# Patient Record
Sex: MALE | Race: WHITE
[De-identification: names, ages, dates, MRNs, and addresses within clinical notes are randomized per-mention and may not be internally consistent; named-entity substitution may affect disease eponyms.]

---

## 2017-01-20 ENCOUNTER — TRANSCRIPTION ENCOUNTER (OUTPATIENT)
Age: 27
End: 2017-01-20

## 2017-12-07 ENCOUNTER — TRANSCRIPTION ENCOUNTER (OUTPATIENT)
Age: 27
End: 2017-12-07

## 2022-02-26 PROBLEM — Z00.00 ENCOUNTER FOR PREVENTIVE HEALTH EXAMINATION: Status: ACTIVE | Noted: 2022-02-26

## 2022-09-26 ENCOUNTER — APPOINTMENT (EMERGENCY)
Dept: RADIOLOGY | Facility: HOSPITAL | Age: 32
End: 2022-09-26
Payer: COMMERCIAL

## 2022-09-26 ENCOUNTER — HOSPITAL ENCOUNTER (EMERGENCY)
Facility: HOSPITAL | Age: 32
Discharge: HOME | End: 2022-09-26
Attending: EMERGENCY MEDICINE | Admitting: EMERGENCY MEDICINE
Payer: COMMERCIAL

## 2022-09-26 VITALS
BODY MASS INDEX: 25.47 KG/M2 | HEIGHT: 72 IN | TEMPERATURE: 98.1 F | DIASTOLIC BLOOD PRESSURE: 70 MMHG | RESPIRATION RATE: 18 BRPM | WEIGHT: 188 LBS | OXYGEN SATURATION: 96 % | SYSTOLIC BLOOD PRESSURE: 126 MMHG | HEART RATE: 63 BPM

## 2022-09-26 DIAGNOSIS — M79.605 LEFT LEG PAIN: Primary | ICD-10-CM

## 2022-09-26 PROCEDURE — 99281 EMR DPT VST MAYX REQ PHY/QHP: CPT

## 2022-09-26 PROCEDURE — 93971 EXTREMITY STUDY: CPT | Mod: LT

## 2022-09-26 RX ORDER — VILAZODONE HYDROCHLORIDE 10 MG/1
20 TABLET ORAL
COMMUNITY
Start: 2022-07-23

## 2022-09-26 RX ORDER — DESVENLAFAXINE SUCCINATE 25 MG/1
25 TABLET, EXTENDED RELEASE ORAL EVERY MORNING
COMMUNITY
Start: 2022-09-19

## 2022-09-26 ASSESSMENT — ENCOUNTER SYMPTOMS
SHORTNESS OF BREATH: 0
MYALGIAS: 1
FEVER: 0
LIGHT-HEADEDNESS: 0
COUGH: 0

## 2022-09-26 NOTE — DISCHARGE INSTRUCTIONS
You were sent here for ultrasound imaging of your left leg.  This was negative for acute blood clot in the deep vein of your leg.  We suggest you rest and not run until your pain improves.  Take ibuprofen (ie: Advil or Motrin) as per package instructions for pain  control. You can take 400mg to 600mg every 4 to 6 hours. Do not take more than 2400mg ibuprofren in 24 hours.  Always take ibuprofen with something in your stomach as it can irritate your stomach lining. Please be sure to drink plenty of fluids.     Return with any significant swelling, worsening pain or any new or concerning symptoms.

## 2022-09-26 NOTE — ED PROVIDER NOTES
Emergency Medicine Note  HPI   HISTORY OF PRESENT ILLNESS     HPI 32-year-old male with past medical history of anxiety presents for evaluation of left lower extremity pain.  Patient states he noticed pain that is intermittent to left anterior thigh.  He went to urgent care prior to arrival who sent him here for ultrasound imaging to rule out DVT.  He notes he has been running frequently this week otherwise denies injury or trauma.  He denies pain currently.  No pain to posterior leg, no associated swelling, erythema, chest pain, shortness of breath, lightheadedness or dizziness, syncope or cough.  He did have a long car ride last weekend, 9 hours.  Otherwise no recent hospitalizations or surgeries.  No history of VTE.      Patient History   PAST HISTORY     Reviewed from Nursing Triage:         Past Medical History:   Diagnosis Date    Anxiety        Past Surgical History:   Procedure Laterality Date    SINUS SURGERY         History reviewed. No pertinent family history.    Social History     Tobacco Use    Smoking status: Never Smoker    Smokeless tobacco: Never Used   Substance Use Topics    Alcohol use: Never    Drug use: Yes         Review of Systems   REVIEW OF SYSTEMS     Review of Systems   Constitutional: Negative for fever.   Respiratory: Negative for cough and shortness of breath.    Cardiovascular: Negative for chest pain and leg swelling.   Musculoskeletal: Positive for myalgias.   Neurological: Negative for syncope and light-headedness.         VITALS     ED Vitals    Date/Time Temp Pulse Resp BP SpO2 [facility]   09/26/22 1641 36.7 °C (98.1 °F) 63 18 126/70 96 % PB                       Physical Exam   PHYSICAL EXAM     Physical Exam  Vitals and nursing note reviewed.   Constitutional:       General: He is not in acute distress.     Appearance: He is well-developed.   HENT:      Head: Atraumatic.   Eyes:      Conjunctiva/sclera: Conjunctivae normal.   Pulmonary:      Effort: Pulmonary effort is normal.    Musculoskeletal:         General: No deformity.      Comments:  No calf or thigh swelling, erythema, tenderness, palpable cords bilaterally. Left leg without reproducible tenderness to anterior thigh. No overlying skin changes. The leg compartments are soft. nv intact distally.    Skin:     General: Skin is warm and dry.   Neurological:      General: No focal deficit present.      Mental Status: He is alert.   Psychiatric:         Behavior: Behavior normal.           PROCEDURES     Procedures     DATA     Results     None          Imaging Results          US venous leg, LL extremity (In process)                 No orders to display       Scoring tools                                  ED Course & MDM   MDM / ED COURSE / CLINICAL IMPRESSION / DISPO     MDM    ED Course as of 09/26/22 1801   Mon Sep 26, 2022   1800 Impression/Plan:    Inmt left leg pain. Unremarkable exam. Us neg for dvt. Stable for dc home. Recc'd rest from running, nsaids. Return precautions discussed.  [JV]      ED Course User Index  [JV] Kristine Magallanes PA C     Clinical Impression      None     Disposition           Kristine Magallanes PA C  09/26/22 1801

## 2022-09-27 NOTE — ED ATTESTATION NOTE
The patient was evaluated and managed by the physician assistant / nurse practitioner.     Kristi Wilkerson MD  09/26/22 2023

## 2022-12-03 ENCOUNTER — HOSPITAL ENCOUNTER (EMERGENCY)
Facility: HOSPITAL | Age: 32
Discharge: HOME | End: 2022-12-03
Attending: EMERGENCY MEDICINE
Payer: COMMERCIAL

## 2022-12-03 ENCOUNTER — APPOINTMENT (EMERGENCY)
Dept: RADIOLOGY | Facility: HOSPITAL | Age: 32
End: 2022-12-03
Payer: COMMERCIAL

## 2022-12-03 VITALS
HEART RATE: 80 BPM | DIASTOLIC BLOOD PRESSURE: 63 MMHG | RESPIRATION RATE: 19 BRPM | BODY MASS INDEX: 25.33 KG/M2 | WEIGHT: 187 LBS | TEMPERATURE: 96.9 F | OXYGEN SATURATION: 96 % | HEIGHT: 72 IN | SYSTOLIC BLOOD PRESSURE: 116 MMHG

## 2022-12-03 DIAGNOSIS — R07.9 NONSPECIFIC CHEST PAIN: Primary | ICD-10-CM

## 2022-12-03 DIAGNOSIS — E83.42 HYPOMAGNESEMIA: ICD-10-CM

## 2022-12-03 LAB
ALBUMIN SERPL-MCNC: 4.2 G/DL (ref 3.4–5)
ALP SERPL-CCNC: 43 IU/L (ref 35–126)
ALT SERPL-CCNC: 26 IU/L (ref 16–63)
ANION GAP SERPL CALC-SCNC: 1 MEQ/L (ref 3–15)
AST SERPL-CCNC: 16 IU/L (ref 15–41)
BASOPHILS # BLD: 0.01 K/UL (ref 0.01–0.1)
BASOPHILS NFR BLD: 0.1 %
BILIRUB SERPL-MCNC: 1.2 MG/DL (ref 0.3–1.2)
BUN SERPL-MCNC: 19 MG/DL (ref 8–20)
CALCIUM SERPL-MCNC: 9 MG/DL (ref 8.9–10.3)
CHLORIDE SERPL-SCNC: 105 MEQ/L (ref 98–109)
CO2 SERPL-SCNC: 31 MEQ/L (ref 22–32)
CREAT SERPL-MCNC: 1.1 MG/DL (ref 0.8–1.3)
DIFFERENTIAL METHOD BLD: ABNORMAL
EOSINOPHIL # BLD: 0.08 K/UL (ref 0.04–0.54)
EOSINOPHIL NFR BLD: 1.1 %
ERYTHROCYTE [DISTWIDTH] IN BLOOD BY AUTOMATED COUNT: 11.8 % (ref 11.6–14.4)
GFR SERPL CREATININE-BSD FRML MDRD: >60 ML/MIN/1.73M*2
GLUCOSE SERPL-MCNC: 124 MG/DL (ref 70–99)
HCT VFR BLDCO AUTO: 40.4 % (ref 40.1–51)
HGB BLD-MCNC: 13.1 G/DL (ref 13.7–17.5)
IMM GRANULOCYTES # BLD AUTO: 0.02 K/UL (ref 0–0.08)
IMM GRANULOCYTES NFR BLD AUTO: 0.3 %
LYMPHOCYTES # BLD: 0.56 K/UL (ref 1.2–3.5)
LYMPHOCYTES NFR BLD: 7.7 %
MAGNESIUM SERPL-MCNC: 1.5 MG/DL (ref 1.8–2.5)
MCH RBC QN AUTO: 27.5 PG (ref 28–33.2)
MCHC RBC AUTO-ENTMCNC: 32.4 G/DL (ref 32.2–36.5)
MCV RBC AUTO: 84.7 FL (ref 83–98)
MONOCYTES # BLD: 0.36 K/UL (ref 0.3–1)
MONOCYTES NFR BLD: 4.9 %
NEUTROPHILS # BLD: 6.25 K/UL (ref 1.7–7)
NEUTS SEG NFR BLD: 85.9 %
NRBC BLD-RTO: 0 %
PDW BLD AUTO: 8.9 FL (ref 9.4–12.4)
PLATELET # BLD AUTO: 178 K/UL (ref 150–350)
POTASSIUM SERPL-SCNC: 3.8 MEQ/L (ref 3.6–5.1)
PROT SERPL-MCNC: 6.8 G/DL (ref 6–8.2)
RBC # BLD AUTO: 4.77 M/UL (ref 4.5–5.8)
SODIUM SERPL-SCNC: 137 MEQ/L (ref 136–144)
TROPONIN I SERPL HS-MCNC: 5.9 PG/ML
TROPONIN I SERPL HS-MCNC: 6 PG/ML
TSH SERPL DL<=0.05 MIU/L-ACNC: 0.94 MIU/L (ref 0.34–5.6)
WBC # BLD AUTO: 7.28 K/UL (ref 3.8–10.5)

## 2022-12-03 PROCEDURE — 63700000 HC SELF-ADMINISTRABLE DRUG: Performed by: PHYSICIAN ASSISTANT

## 2022-12-03 PROCEDURE — 85025 COMPLETE CBC W/AUTO DIFF WBC: CPT

## 2022-12-03 PROCEDURE — 71045 X-RAY EXAM CHEST 1 VIEW: CPT

## 2022-12-03 PROCEDURE — 93005 ELECTROCARDIOGRAM TRACING: CPT

## 2022-12-03 PROCEDURE — 83735 ASSAY OF MAGNESIUM: CPT | Performed by: EMERGENCY MEDICINE

## 2022-12-03 PROCEDURE — 25800000 HC PHARMACY IV SOLUTIONS: Performed by: PHYSICIAN ASSISTANT

## 2022-12-03 PROCEDURE — 80053 COMPREHEN METABOLIC PANEL: CPT

## 2022-12-03 PROCEDURE — 84443 ASSAY THYROID STIM HORMONE: CPT | Performed by: EMERGENCY MEDICINE

## 2022-12-03 PROCEDURE — 36415 COLL VENOUS BLD VENIPUNCTURE: CPT

## 2022-12-03 PROCEDURE — 84484 ASSAY OF TROPONIN QUANT: CPT

## 2022-12-03 PROCEDURE — 99283 EMERGENCY DEPT VISIT LOW MDM: CPT | Mod: 25

## 2022-12-03 PROCEDURE — 84484 ASSAY OF TROPONIN QUANT: CPT | Mod: 91

## 2022-12-03 RX ORDER — ALUMINUM HYDROXIDE, MAGNESIUM HYDROXIDE, AND SIMETHICONE 1200; 120; 1200 MG/30ML; MG/30ML; MG/30ML
30 SUSPENSION ORAL ONCE
Status: DISCONTINUED | OUTPATIENT
Start: 2022-12-03 | End: 2022-12-03 | Stop reason: HOSPADM

## 2022-12-03 RX ORDER — LIDOCAINE HYDROCHLORIDE 20 MG/ML
10 SOLUTION OROPHARYNGEAL ONCE
Status: DISCONTINUED | OUTPATIENT
Start: 2022-12-03 | End: 2022-12-03 | Stop reason: HOSPADM

## 2022-12-03 RX ORDER — LANOLIN ALCOHOL/MO/W.PET/CERES
400 CREAM (GRAM) TOPICAL ONCE
Status: COMPLETED | OUTPATIENT
Start: 2022-12-03 | End: 2022-12-03

## 2022-12-03 RX ADMIN — Medication 400 MG: at 10:20

## 2022-12-03 RX ADMIN — SODIUM CHLORIDE 1000 ML: 9 INJECTION, SOLUTION INTRAVENOUS at 10:20

## 2022-12-03 ASSESSMENT — ENCOUNTER SYMPTOMS
ACTIVITY CHANGE: 0
FACIAL SWELLING: 0
SORE THROAT: 0
NAUSEA: 0
WEAKNESS: 0
VOMITING: 0
NECK PAIN: 0
SEIZURES: 0
HEMATURIA: 0
COUGH: 0
BACK PAIN: 0
AGITATION: 0
SPEECH DIFFICULTY: 0
DIARRHEA: 0
COLOR CHANGE: 0
DIFFICULTY URINATING: 0
ABDOMINAL PAIN: 1
SHORTNESS OF BREATH: 0
FEVER: 0
HEADACHES: 0
DIAPHORESIS: 0
WHEEZING: 0

## 2022-12-03 NOTE — ED PROVIDER NOTES
Emergency Medicine Note  HPI   HISTORY OF PRESENT ILLNESS     HPI     32-year-old male presents for evaluation of chest pain that started at 6 am while the patient was sleeping.  Patient states that the 8+ out of 10 midsternal chest pain radiating to the mid epigastric area and his back and change with moving around in bed.  He states the discomfort felt like indigestion and describes it now as a 1 out of 10 dull discomfort.  He did take Gas-X, 2 doses, about 30 minutes prior to arrival in the emergency department.  Patient denies any long distance travel.  Denies any fever/chills, abdominal pain, calf pain, diaphoresis, shortness of breath, blood in urine or stool, syncope, black or tarry stool, any other symptoms.    Patient is not known to cardiologist.  The patient has no history of hypertension, hyperlipidemia, tobacco use, or CAD.  He states his father has some type of cardiac history. No sudden death under age of 50 in family. No history of bleeding disorders or coagulopathy. No stress test or ECHO history.      Patient History   PAST HISTORY     Reviewed from Nursing Triage:  Tobacco  Allergies  Meds  Problems  Med Hx  Surg Hx  Fam Hx  Soc   Hx      Past Medical History:   Diagnosis Date   • Anxiety        Past Surgical History:   Procedure Laterality Date   • SINUS SURGERY         History reviewed. No pertinent family history.    Social History     Tobacco Use   • Smoking status: Never   • Smokeless tobacco: Never   Vaping Use   • Vaping Use: Never used   Substance Use Topics   • Alcohol use: Never   • Drug use: Yes         Review of Systems   REVIEW OF SYSTEMS     Review of Systems   Constitutional: Negative for activity change, diaphoresis and fever.   HENT: Negative for facial swelling and sore throat.    Eyes: Negative for visual disturbance.   Respiratory: Negative for cough, shortness of breath and wheezing.    Cardiovascular: Positive for chest pain. Negative for leg swelling.    Gastrointestinal: Positive for abdominal pain. Negative for diarrhea, nausea and vomiting.   Genitourinary: Negative for difficulty urinating and hematuria.   Musculoskeletal: Negative for back pain and neck pain.   Skin: Negative for color change and pallor.   Neurological: Negative for seizures, syncope, speech difficulty, weakness and headaches.   Psychiatric/Behavioral: Negative for agitation and behavioral problems.   All other systems reviewed and are negative.        VITALS     ED Vitals    Date/Time Temp Pulse Resp BP SpO2 [facility]   12/03/22 1100 -- 74 18 112/60 97 % DWF   12/03/22 1000 -- 74 14 115/70 96 % DWF   12/03/22 0900 -- 76 16 112/61 96 % DWF   12/03/22 0828 -- 85 17 123/63 95 % DW   12/03/22 0819 -- -- -- 101/57 -- HKR   12/03/22 0815 36.1 °C (96.9 °F) 79 20 87/45 95 % HKR        Pulse Ox %: 99 % (12/03/22 0849)  Pulse Ox Interpretation: Normal (12/03/22 0849)  Heart Rate: 87 (12/03/22 0849)  Rhythm Strip Interpretation: Normal Sinus Rhythm (12/03/22 0849)     Physical Exam   PHYSICAL EXAM     Physical Exam  Vitals and nursing note reviewed.   Constitutional:       Appearance: He is well-developed.   HENT:      Head: Normocephalic and atraumatic.   Eyes:      Conjunctiva/sclera: Conjunctivae normal.   Cardiovascular:      Rate and Rhythm: Normal rate and regular rhythm.   Pulmonary:      Effort: Pulmonary effort is normal.      Breath sounds: Normal breath sounds.   Abdominal:      General: There is no distension.      Palpations: Abdomen is soft. There is no mass.      Tenderness: There is no abdominal tenderness.   Musculoskeletal:         General: No tenderness or deformity. Normal range of motion.      Cervical back: Normal range of motion.   Skin:     General: Skin is warm and dry.   Neurological:      General: No focal deficit present.      Mental Status: He is alert. Mental status is at baseline.   Psychiatric:         Behavior: Behavior normal.           PROCEDURES     Procedures     DATA      Results     Procedure Component Value Units Date/Time    TSH w reflex FT4 [[id number]]  (Normal) Collected: 12/03/22 0839    Specimen: Blood, Venous Updated: 12/03/22 1017     TSH 0.94 mIU/L     Magnesium [[id number]]  (Abnormal) Collected: 12/03/22 0839    Specimen: Blood, Venous Updated: 12/03/22 0949     Magnesium 1.5 mg/dL     Comprehensive metabolic panel [[id number]]  (Abnormal) Collected: 12/03/22 0839    Specimen: Blood, Venous Updated: 12/03/22 0949     Sodium 137 mEQ/L      Potassium 3.8 mEQ/L      Comment: Results obtained on plasma. Plasma Potassium values may be up to 0.4 mEQ/L less than serum values. The differences may be greater for patients with high platelet or white cell counts.        Chloride 105 mEQ/L      CO2 31 mEQ/L      BUN 19 mg/dL      Creatinine 1.1 mg/dL      Glucose 124 mg/dL      Calcium 9.0 mg/dL      AST (SGOT) 16 IU/L      ALT (SGPT) 26 IU/L      Alkaline Phosphatase 43 IU/L      Total Protein 6.8 g/dL      Comment: Test performed on plasma which typically contains approximately 0.4 g/dL more protein than serum.        Albumin 4.2 g/dL      Bilirubin, Total 1.2 mg/dL      eGFR >60.0 mL/min/1.73m*2      Anion Gap 1 mEQ/L     HS Troponin I (with 2 hour reflex) [[id number]]  (Normal) Collected: 12/03/22 0839    Specimen: Blood, Venous Updated: 12/03/22 0926     High Sens Troponin I 6.0 pg/mL     CBC and differential [[id number]]  (Abnormal) Collected: 12/03/22 0839    Specimen: Blood, Venous Updated: 12/03/22 0851     WBC 7.28 K/uL      RBC 4.77 M/uL      Hemoglobin 13.1 g/dL      Hematocrit 40.4 %      MCV 84.7 fL      MCH 27.5 pg      MCHC 32.4 g/dL      RDW 11.8 %      Platelets 178 K/uL      MPV 8.9 fL      Differential Type Auto     nRBC 0.0 %      Immature Granulocytes 0.3 %      Neutrophils 85.9 %      Lymphocytes 7.7 %      Monocytes 4.9 %      Eosinophils 1.1 %      Basophils 0.1 %      Immature Granulocytes, Absolute 0.02 K/uL      Neutrophils, Absolute 6.25 K/uL       Lymphocytes, Absolute 0.56 K/uL      Monocytes, Absolute 0.36 K/uL      Eosinophils, Absolute 0.08 K/uL      Basophils, Absolute 0.01 K/uL     RAINBOW RED [[id number]] Collected: 12/03/22 0839    Specimen: Blood, Venous Updated: 12/03/22 0843    RAINBOW GOLD [[id number]] Collected: 12/03/22 0839    Specimen: Blood, Venous Updated: 12/03/22 0843    [place] Draw Panel [[id number]] Collected: 12/03/22 0839    Specimen: Blood, Venous Updated: 12/03/22 0843    Narrative:      The following orders were created for panel order [place] Draw Panel.  Procedure                               Abnormality         Status                     ---------                               -----------         ------                     RAINBOW RED[[id number]]                                      In process                 RAINBOW LT BLUE[[id number]]                                  In process                 OSWALDO GOLD[[id number]]                                     In process                   Please view results for these tests on the individual orders.    OSWALDO LT BLUE [[id number]] Collected: 12/03/22 0839    Specimen: Blood, Venous Updated: 12/03/22 0843          Imaging Results          X-RAY CHEST 1 VIEW (Final result)  Result time 12/03/22 09:35:24    Final result                 Impression:    IMPRESSION:    No active disease.             Narrative:    CLINICAL HISTORY: Chest pain.    AP portable view of the chest.    COMPARISON: None available.    COMMENT:    The heart is normal in size.  The cardiomediastinal silhouette is unremarkable.  The lungs are clear. The visualized soft tissue and osseous structures are  unremarkable.                                ECG 12 lead   ED Interpretation   Reviewed with attending. RH    Rhythm: [NSR]  Rate: 70  P waves: [normal interval]  QRS: [normal QRS]  Axis: [normal]  ST Segments: [no obvious ST elevation or ischemia]      Impression: Normal sinus rhythm; possible left atrial enlargement;  nonspecific ST abnormality; no prior ECG for comparison                Scoring tools                                  ED Course & MDM   MDM / ED COURSE / CLINICAL IMPRESSION / DISPO     MDM  Number of Diagnoses or Management Options  Hypomagnesemia: new and requires workup  Nonspecific chest pain: new and requires workup     Amount and/or Complexity of Data Reviewed  Clinical lab tests: reviewed and ordered  Tests in the radiology section of CPT®: reviewed and ordered  Tests in the medicine section of CPT®: reviewed and ordered  Decide to obtain previous medical records or to obtain history from someone other than the patient: yes  Discuss the patient with other providers: yes  Independent visualization of images, tracings, or specimens: yes    Risk of Complications, Morbidity, and/or Mortality  Presenting problems: moderate  Diagnostic procedures: moderate  Management options: moderate    Patient Progress  Patient progress: improved      ED Course as of 12/03/22 1121   Sat Dec 03, 2022   0830 Impression: Chest pain from 6-8 am; improved after gas-x  Plan: labs, trop, mag, tsh, ecg, cxr, cardiac monitoring, reeval    Discussed work up and plan with Dr. Caraballo who is in agreement   [RH]   0950 Magnesium(!): 1.5  Replaced orally [RH]   0950 X-RAY CHEST 1 VIEW  IMPRESSION:     No active disease. [RH]   1053 HIGH SENSITIVE TROPONIN I (NO REFLEX)  pending [RH]   1118 Unremarkable labs DC home  with no history of tobacco abuse, otherwise healthy, p/w atypical chest pain ML of nonemergent etiology.    ECG without overt e/o STEMI, Brugada's sign, delta wave, epsilon wave, significantly prolonged QTc, or malignant arrhythmia.  Low Wells score with low risk for PE and no significant hypoxia.  Given chronicity and pain characteristics, low s/f dissection.  Low s/f dissection, no new mediastinal widening on CXR.  CXR without evidence of significant PTX. No PNA.    HEART Score: 2 (nonspecific ST abnormality on ECG; RFs (FH and  BMI)    Pain controlled, well appearing.  Cautious return precautions discussed with full understanding.  Prompt follow up with primary care physician discussed. [RH]      ED Course User Index  [RH] Carolin Moore PA C     Clinical Impression      Nonspecific chest pain   Hypomagnesemia     _________________     ED Disposition   Discharge                   Carolin Moore PA C  12/03/22 1126

## 2022-12-03 NOTE — DISCHARGE INSTRUCTIONS
Please call your primary care doctor and the cardiologist today to inform them of your ER visit today and arrange a follow up appointment within 2 days. Please return immediately to the emergency department for any new/worsening or concerning symptoms.

## 2022-12-04 LAB
ATRIAL RATE: 70
P AXIS: 59
PR INTERVAL: 166
QRS DURATION: 84
QT INTERVAL: 378
QTC CALCULATION(BAZETT): 408
R AXIS: 59
T WAVE AXIS: 21
VENTRICULAR RATE: 70

## 2022-12-04 PROCEDURE — 93010 ELECTROCARDIOGRAM REPORT: CPT | Performed by: INTERNAL MEDICINE

## 2022-12-05 NOTE — ED ATTESTATION NOTE
Procedures  Physical Exam  Review of Systems    12/4/20227:13 PM  I have personally seen and examined the patient.  I reviewed and agree with the PA/NP/Resident's assessment and plan of care.    My examination, assessment, and plan of care of Jose Mahan is as follows:    The patient presents with chest discomfort in the center of the chest squeezing.  Also felt in the back.  Seems to get a little worse with laying down.  Took some Gas-X and did feel better.  Says the pain was not exertional.  No associated shortness of breath.    Exam: Awake alert and oriented.  Lungs are clear.  Heart S1-S2 without any murmur.  Abdomen soft with some mild epigastric tenderness.  No pedal edema.  Neuro exam is nonfocal.    Impression/Plan: EKG, troponin and chest x-ray are all negative.  Symptoms seem most consistent with esophageal spasm and gastroesophageal reflux disease.  Agree with PA management and discharge.  May use Prilosec 20 mg twice a day over-the-counter for 5 days then 20 mg once a day thereafter, for total 2 weeks.    Vital Signs Review: Vital signs have been reviewed. The oxygen saturation is  SpO2: 95 % which is normal.    This document was created using dragon dictation software.  There might be some typographical errors due to this technology.    We are in a pandemic with increased volumes and decreased capacity.      Eze Caraballo MD  12/04/22 1916

## 2023-10-08 ENCOUNTER — NON-APPOINTMENT (OUTPATIENT)
Age: 33
End: 2023-10-08

## 2024-09-04 ENCOUNTER — NON-APPOINTMENT (OUTPATIENT)
Age: 34
End: 2024-09-04

## 2024-12-18 ENCOUNTER — APPOINTMENT (OUTPATIENT)
Dept: ORTHOPEDIC SURGERY | Facility: CLINIC | Age: 34
End: 2024-12-18

## 2024-12-18 VITALS — BODY MASS INDEX: 23.7 KG/M2 | WEIGHT: 175 LBS | HEIGHT: 72 IN

## 2024-12-18 DIAGNOSIS — Z86.59 PERSONAL HISTORY OF OTHER MENTAL AND BEHAVIORAL DISORDERS: ICD-10-CM

## 2024-12-18 DIAGNOSIS — S83.205A OTHER TEAR OF UNSPECIFIED MENISCUS, CURRENT INJURY, UNSPECIFIED KNEE, INITIAL ENCOUNTER: ICD-10-CM

## 2024-12-18 PROCEDURE — 99204 OFFICE O/P NEW MOD 45 MIN: CPT

## 2024-12-18 PROCEDURE — 73564 X-RAY EXAM KNEE 4 OR MORE: CPT | Mod: LT

## 2024-12-18 RX ORDER — VILAZODONE HYDROCHLORIDE 40 MG/1
TABLET ORAL
Refills: 0 | Status: ACTIVE | COMMUNITY

## 2024-12-18 RX ORDER — DESVENLAFAXINE SUCCINATE 25 MG/1
TABLET, EXTENDED RELEASE ORAL
Refills: 0 | Status: ACTIVE | COMMUNITY

## 2024-12-18 RX ORDER — MELOXICAM 15 MG/1
15 TABLET ORAL
Qty: 30 | Refills: 1 | Status: ACTIVE | COMMUNITY
Start: 2024-12-18 | End: 1900-01-01

## 2024-12-26 ENCOUNTER — APPOINTMENT (OUTPATIENT)
Dept: MRI IMAGING | Facility: CLINIC | Age: 34
End: 2024-12-26
Payer: COMMERCIAL

## 2024-12-26 PROCEDURE — 73721 MRI JNT OF LWR EXTRE W/O DYE: CPT | Mod: LT

## 2025-01-04 PROBLEM — M23.92 ACUTE INTERNAL DERANGEMENT OF LEFT KNEE: Status: ACTIVE | Noted: 2025-01-04

## 2025-01-04 PROBLEM — Z86.59 HISTORY OF ANXIETY: Status: RESOLVED | Noted: 2024-12-18 | Resolved: 2025-01-04

## 2025-01-04 PROBLEM — M17.12 PRIMARY OSTEOARTHRITIS OF LEFT KNEE: Status: ACTIVE | Noted: 2025-01-04

## 2025-01-08 ENCOUNTER — APPOINTMENT (OUTPATIENT)
Dept: ORTHOPEDIC SURGERY | Facility: CLINIC | Age: 35
End: 2025-01-08
Payer: COMMERCIAL

## 2025-01-08 VITALS — HEIGHT: 72 IN | BODY MASS INDEX: 23.7 KG/M2 | WEIGHT: 175 LBS

## 2025-01-08 DIAGNOSIS — M23.92 UNSPECIFIED INTERNAL DERANGEMENT OF LEFT KNEE: ICD-10-CM

## 2025-01-08 DIAGNOSIS — M17.12 UNILATERAL PRIMARY OSTEOARTHRITIS, LEFT KNEE: ICD-10-CM

## 2025-01-08 PROCEDURE — 99214 OFFICE O/P EST MOD 30 MIN: CPT

## 2025-01-18 ENCOUNTER — TRANSCRIPTION ENCOUNTER (OUTPATIENT)
Age: 35
End: 2025-01-18

## 2025-08-20 ENCOUNTER — APPOINTMENT (OUTPATIENT)
Dept: ORTHOPEDIC SURGERY | Facility: CLINIC | Age: 35
End: 2025-08-20

## 2025-08-20 DIAGNOSIS — G56.20 LESION OF ULNAR NERVE, UNSPECIFIED UPPER LIMB: ICD-10-CM

## 2025-08-20 DIAGNOSIS — S53.449A ULNAR COLLATERAL LIGAMENT SPRAIN OF UNSPECIFIED ELBOW, INITIAL ENCOUNTER: ICD-10-CM

## 2025-08-20 PROCEDURE — G2211 COMPLEX E/M VISIT ADD ON: CPT | Mod: NC

## 2025-08-20 PROCEDURE — 73080 X-RAY EXAM OF ELBOW: CPT | Mod: RT

## 2025-08-20 PROCEDURE — 99214 OFFICE O/P EST MOD 30 MIN: CPT

## 2025-08-27 ENCOUNTER — APPOINTMENT (OUTPATIENT)
Dept: NEUROLOGY | Facility: CLINIC | Age: 35
End: 2025-08-27
Payer: COMMERCIAL

## 2025-08-27 PROCEDURE — 95912 NRV CNDJ TEST 11-12 STUDIES: CPT

## 2025-08-27 PROCEDURE — 95886 MUSC TEST DONE W/N TEST COMP: CPT

## 2025-09-03 ENCOUNTER — APPOINTMENT (OUTPATIENT)
Dept: ORTHOPEDIC SURGERY | Facility: CLINIC | Age: 35
End: 2025-09-03